# Patient Record
Sex: MALE | Race: WHITE | Employment: UNEMPLOYED | URBAN - METROPOLITAN AREA
[De-identification: names, ages, dates, MRNs, and addresses within clinical notes are randomized per-mention and may not be internally consistent; named-entity substitution may affect disease eponyms.]

---

## 2020-05-11 ENCOUNTER — HOSPITAL ENCOUNTER (EMERGENCY)
Age: 50
Discharge: HOME | End: 2020-05-11
Attending: EMERGENCY MEDICINE
Payer: MEDICARE

## 2020-05-11 VITALS
TEMPERATURE: 97.7 F | BODY MASS INDEX: 23.04 KG/M2 | SYSTOLIC BLOOD PRESSURE: 110 MMHG | HEIGHT: 63 IN | WEIGHT: 130 LBS | RESPIRATION RATE: 16 BRPM | OXYGEN SATURATION: 99 % | DIASTOLIC BLOOD PRESSURE: 71 MMHG | HEART RATE: 86 BPM

## 2020-05-11 DIAGNOSIS — T40.601A OPIATE OVERDOSE, ACCIDENTAL OR UNINTENTIONAL, INITIAL ENCOUNTER (HCC): Primary | ICD-10-CM

## 2020-05-11 PROCEDURE — 99285 EMERGENCY DEPT VISIT HI MDM: CPT

## 2020-05-11 NOTE — ED PROVIDER NOTES
Ari Rodgers is a 44 y.o. male seen on 5/11/2020 at 10:03 AM in the [facility] EMERGENCY DEPT in room ER08/08. Chief Complaint   Patient presents with    Drug Overdose     HPI: 24-year-old male presented to the emergency department by EMS for evaluation of an overdose. EMS notes that they were [place] [employer] [place] when a friend of the patient approached the vehicle stating that they thought their friend had overdosed. They responded on scene and found the patient to have pinpoint pupils, unresponsive, slow respirations. He was given 2 mg of IM Narcan and immediately improved in mental status. He was combative, intermittently refusing treatment. No IV was placed in route. He was transported to the emergency department. Here he states that he snorted something that he thinks may have been fentanyl but he was told it was probably heroin. He states that he does not typically use opiates. He does have a history of alcohol abuse. He denies any history of IV drug abuse. Historian: Patient    REVIEW OF SYSTEMS     Review of Systems   Constitutional: Negative for fatigue and fever. HENT: Negative. Eyes: Negative. Respiratory: Negative for cough, chest tightness, shortness of breath and wheezing. Cardiovascular: Negative for chest pain. Gastrointestinal: Negative for abdominal distention, abdominal pain, diarrhea, nausea and vomiting. Genitourinary: Negative for dysuria, flank pain, frequency, genital sores and urgency. Musculoskeletal: Negative. Skin: Negative for rash. Neurological: Negative for dizziness, syncope and headaches. All other systems reviewed and are negative. PAST MEDICAL HISTORY     Past Medical History:   Diagnosis Date    Other ill-defined conditions     Psychiatric disorder     Schizophrenic     No past surgical history on file.   Social History     Socioeconomic History    Marital status:  Spouse name: Not on file    Number of children: Not on file    Years of education: Not on file    Highest education level: Not on file   Tobacco Use    Smoking status: Current Every Day Smoker     Packs/day: 0.50    Smokeless tobacco: Never Used   Substance and Sexual Activity    Alcohol use: No     Comment: socially    Drug use: Yes     Types: Methamphetamines    Sexual activity: Not Currently     Comment: ? meth states 12 weeks ago last use     Prior to Admission Medications   Prescriptions Last Dose Informant Patient Reported? Taking? ESOMEPRAZOLE MAGNESIUM (NEXIUM PO)   Yes No   Sig: Take 40 mg by mouth daily. mirtazapine (REMERON) 30 mg tablet   Yes No   Sig: Take 30 mg by mouth nightly. Facility-Administered Medications: None     Allergies   Allergen Reactions    Morphine Rash        PHYSICAL EXAM       Vitals:    05/11/20 0959   BP: 110/75   Pulse: 93   Resp: 16   Temp: 97.7 °F (36.5 °C)   SpO2: 98%    Vital signs were reviewed. Physical Exam  Vitals signs reviewed. Constitutional:       General: He is not in acute distress. Appearance: He is well-developed. He is not diaphoretic. HENT:      Head: Normocephalic and atraumatic. Eyes:      Pupils: Pupils are equal, round, and reactive to light. Comments: Miotic   Neck:      Musculoskeletal: Normal range of motion and neck supple. Cardiovascular:      Rate and Rhythm: Normal rate and regular rhythm. Heart sounds: Normal heart sounds. No murmur. No friction rub. No gallop. Pulmonary:      Effort: Pulmonary effort is normal. No respiratory distress. Breath sounds: Normal breath sounds. No wheezing. Abdominal:      General: Bowel sounds are normal. There is no distension. Palpations: Abdomen is soft. There is no mass. Tenderness: There is no abdominal tenderness. There is no guarding or rebound. Musculoskeletal: Normal range of motion. General: No tenderness or deformity.    Skin: General: Skin is warm. Findings: No erythema or rash. Neurological:      Mental Status: He is alert and oriented to person, place, and time. Sensory: No sensory deficit. Coordination: Coordination normal.      Comments: No focal deficits   Psychiatric:         Behavior: Behavior normal.         Thought Content: Thought content normal.          MEDICAL DECISION MAKING       ED Course:    10:48 AM   the patient is awake, alert, conversational, oriented to person and place. He is very concerned about his personal belongings that were left at the gas station. He does not wish to stay in the emergency department any longer for further care. I have recommended he stay for further observation to ensure he does not have any return of mental status depression, however at this time he is alert, oriented and able to make decisions. He is asking to be discharged. Disposition:  Dc to home  Diagnosis:  Opiate overdose  ____________________________________________________________________  A portion of this note was generated using voice recognition dictation software. While the note has been reviewed for accuracy, please note certain words and phrases may not be transcribed as intended and some grammatical and/or typographical errors may be present.

## 2020-05-11 NOTE — ED TRIAGE NOTES
EMS notified of a possible overdose while [place] [employer]. Friend of pt states they thought he had overdosed on fentanyl. EMS gave pt 2mg narcan IM. Has hx of ETOH abuse and drug abuse.  bgl 136, bp 120/70, hr 110, 98% RA, initial RR 10-12.